# Patient Record
Sex: MALE | Employment: FULL TIME | URBAN - METROPOLITAN AREA
[De-identification: names, ages, dates, MRNs, and addresses within clinical notes are randomized per-mention and may not be internally consistent; named-entity substitution may affect disease eponyms.]

---

## 2021-02-10 ENCOUNTER — OFFICE VISIT (OUTPATIENT)
Dept: FAMILY MEDICINE CLINIC | Facility: HOSPITAL | Age: 61
End: 2021-02-10
Payer: COMMERCIAL

## 2021-02-10 VITALS
OXYGEN SATURATION: 96 % | HEART RATE: 76 BPM | SYSTOLIC BLOOD PRESSURE: 132 MMHG | HEIGHT: 70 IN | TEMPERATURE: 98.4 F | BODY MASS INDEX: 36.59 KG/M2 | WEIGHT: 255.6 LBS | DIASTOLIC BLOOD PRESSURE: 80 MMHG

## 2021-02-10 DIAGNOSIS — N52.9 ERECTILE DYSFUNCTION, UNSPECIFIED ERECTILE DYSFUNCTION TYPE: ICD-10-CM

## 2021-02-10 DIAGNOSIS — Z12.5 SCREENING FOR MALIGNANT NEOPLASM OF PROSTATE: ICD-10-CM

## 2021-02-10 DIAGNOSIS — Z00.00 ANNUAL PHYSICAL EXAM: Primary | ICD-10-CM

## 2021-02-10 PROBLEM — E78.5 HYPERLIPOPROTEINEMIA: Status: ACTIVE | Noted: 2021-02-10

## 2021-02-10 PROCEDURE — 3725F SCREEN DEPRESSION PERFORMED: CPT | Performed by: FAMILY MEDICINE

## 2021-02-10 PROCEDURE — 1036F TOBACCO NON-USER: CPT | Performed by: FAMILY MEDICINE

## 2021-02-10 PROCEDURE — 99386 PREV VISIT NEW AGE 40-64: CPT | Performed by: FAMILY MEDICINE

## 2021-02-10 PROCEDURE — 3008F BODY MASS INDEX DOCD: CPT | Performed by: FAMILY MEDICINE

## 2021-02-10 RX ORDER — MULTIVITAMIN
1 TABLET ORAL DAILY
COMMUNITY

## 2021-02-10 RX ORDER — SILDENAFIL 100 MG/1
100 TABLET, FILM COATED ORAL DAILY PRN
Qty: 10 TABLET | Refills: 0 | Status: SHIPPED | OUTPATIENT
Start: 2021-02-10 | End: 2021-03-01 | Stop reason: SDUPTHER

## 2021-02-10 NOTE — PROGRESS NOTES
Valencia Grover MD    NAME: Jay Samuel  AGE: 56 y o  SEX: male  : 1960     DATE: 2021     Assessment and Plan:     Problem List Items Addressed This Visit     None      Visit Diagnoses     Annual physical exam    -  Primary    Relevant Orders    CBC and Platelet (Completed)    Comprehensive metabolic panel (Completed)    Hemoglobin A1C With EAG    Lipid Panel with Direct LDL reflex (Completed)    TSH, 3rd generation (Completed)    Erectile dysfunction, unspecified erectile dysfunction type        Relevant Medications    sildenafil (VIAGRA) 100 mg tablet    Other Relevant Orders    Testosterone (Completed)    Screening for malignant neoplasm of prostate        Relevant Orders    PSA, Total Screen (Completed)          Immunizations and preventive care screenings were discussed with patient today  Appropriate education was printed on patient's after visit summary  Counseling:  Alcohol/drug use: discussed moderation in alcohol intake, the recommendations for healthy alcohol use, and avoidance of illicit drug use  Dental Health: discussed importance of regular tooth brushing, flossing, and dental visits  Injury prevention: discussed safety/seat belts, safety helmets, smoke detectors, carbon dioxide detectors, and smoking near bedding or upholstery  · Exercise: the importance of regular exercise/physical activity was discussed  Recommend exercise 3-5 times per week for at least 30 minutes  BMI Counseling: Body mass index is 36 67 kg/m²  The BMI is above normal  Nutrition recommendations include decreasing portion sizes, moderation in carbohydrate intake and reducing intake of cholesterol  Exercise recommendations include vigorous physical activity 75 minutes/week  No pharmacotherapy was ordered             Return in about 1 year (around 2/10/2022) for Annual physical      Chief Complaint:     Chief Complaint   Patient presents with   Ankit Doty Annual Exam    Establish Care      History of Present Illness:     Adult Annual Physical   Patient here for a comprehensive physical exam  The patient reports no problems  Aware of weight gain from snacking  Will get back into martial arts    Wants to get blood work done  Had elevated sugar  Family history of Type 2 diabetes  Had colonoscopy   Diet and Physical Activity  · Diet/Nutrition: well balanced diet  · Exercise: moderate cardiovascular exercise  Depression Screening  PHQ-9 Depression Screening    PHQ-9:   Frequency of the following problems over the past two weeks:      Little interest or pleasure in doing things: 0 - not at all  Feeling down, depressed, or hopeless: 0 - not at all  PHQ-2 Score: 0       General Health  · Sleep: sleeps well  · Hearing: normal - bilateral   · Vision: no vision problems  · Dental: regular dental visits and brushes teeth twice daily   Health  · Symptoms include: erectile dysfunction     Review of Systems:     Review of Systems   Constitutional: Positive for unexpected weight change  Negative for fatigue  HENT: Negative  Respiratory: Negative  Cardiovascular: Negative  Gastrointestinal: Negative for abdominal pain  Genitourinary: Negative  Musculoskeletal: Negative  Neurological: Negative  Psychiatric/Behavioral: Negative  All other systems reviewed and are negative  Past Medical History:     History reviewed  No pertinent past medical history  Past Surgical History:     History reviewed  No pertinent surgical history     Family History:     Family History   Problem Relation Age of Onset    Lung cancer Mother     Pancreatic cancer Father     Diabetes Father     Breast cancer Sister     Diabetes Maternal Grandmother     Diabetes Paternal Grandmother       Social History:        Social History     Socioeconomic History    Marital status: Unknown     Spouse name: None    Number of children: None    Years of education: None    Highest education level: None   Occupational History    None   Social Needs    Financial resource strain: None    Food insecurity     Worry: None     Inability: None    Transportation needs     Medical: None     Non-medical: None   Tobacco Use    Smoking status: Former Smoker     Quit date: 2/10/1990     Years since quittin 0    Smokeless tobacco: Never Used   Substance and Sexual Activity    Alcohol use: None    Drug use: None    Sexual activity: None   Lifestyle    Physical activity     Days per week: None     Minutes per session: None    Stress: None   Relationships    Social connections     Talks on phone: None     Gets together: None     Attends Oriental orthodox service: None     Active member of club or organization: None     Attends meetings of clubs or organizations: None     Relationship status: None    Intimate partner violence     Fear of current or ex partner: None     Emotionally abused: None     Physically abused: None     Forced sexual activity: None   Other Topics Concern    None   Social History Narrative    None      Current Medications:     Current Outpatient Medications   Medication Sig Dispense Refill    ELDERBERRY PO Take by mouth      Multiple Vitamin (multivitamin) tablet Take 1 tablet by mouth daily      sildenafil (VIAGRA) 100 mg tablet Take 1 tablet (100 mg total) by mouth daily as needed for erectile dysfunction 10 tablet 0     No current facility-administered medications for this visit  Allergies:     No Known Allergies   Physical Exam:     /80 (Patient Position: Sitting, Cuff Size: Large)   Pulse 76   Temp 98 4 °F (36 9 °C) (Temporal)   Ht 5' 10" (1 778 m)   Wt 116 kg (255 lb 9 6 oz)   SpO2 96%   BMI 36 67 kg/m²     Physical Exam  Vitals signs and nursing note reviewed  Constitutional:       Appearance: Normal appearance  HENT:      Head: Normocephalic and atraumatic        Right Ear: Tympanic membrane, ear canal and external ear normal  There is no impacted cerumen  Left Ear: Tympanic membrane, ear canal and external ear normal  There is no impacted cerumen  Nose: Nose normal       Mouth/Throat:      Mouth: Mucous membranes are moist    Eyes:      Pupils: Pupils are equal, round, and reactive to light  Neck:      Vascular: No carotid bruit  Cardiovascular:      Rate and Rhythm: Normal rate and regular rhythm  Pulses: Normal pulses  Heart sounds: Normal heart sounds  Pulmonary:      Effort: Pulmonary effort is normal       Breath sounds: Normal breath sounds  Abdominal:      General: There is no distension  Palpations: There is no mass  Musculoskeletal: Normal range of motion  Lymphadenopathy:      Cervical: No cervical adenopathy  Skin:     General: Skin is warm  Neurological:      General: No focal deficit present  Mental Status: He is alert and oriented to person, place, and time  Psychiatric:         Mood and Affect: Mood normal          Behavior: Behavior normal          Thought Content:  Thought content normal          Judgment: Judgment normal           MD Negin Estrada MD

## 2021-02-10 NOTE — PATIENT INSTRUCTIONS

## 2021-02-11 ENCOUNTER — TELEPHONE (OUTPATIENT)
Dept: FAMILY MEDICINE CLINIC | Facility: HOSPITAL | Age: 61
End: 2021-02-11

## 2021-02-11 ENCOUNTER — TELEPHONE (OUTPATIENT)
Dept: ADMINISTRATIVE | Facility: OTHER | Age: 61
End: 2021-02-11

## 2021-02-11 NOTE — TELEPHONE ENCOUNTER
Upon review of the In Basket request and the patient's chart, initial outreach has been made via fax, please see Contacts section for details  Thank you  Ronald Kat     [facility] Sincere Oneil MD [phone] Fax [phone]

## 2021-02-11 NOTE — LETTER
Procedure Request Form: Colonoscopy      Date Requested: 21  Patient: Real Link  Patient : 1960   Referring Provider: Allyssa Hendrix, MD     2nd Request        Date of Procedure _____ or most recent colonscopy report__________       The above patient has informed us that they have completed their   most recent Colonoscopy at your facility  Please complete   this form and attach all corresponding procedure reports/results  Comments __________________________________________________________  ____________________________________________________________________  ____________________________________________________________________  ____________________________________________________________________    Facility Completing Procedure _________________________________________    Form Completed By (print name) _______________________________________      Signature __________________________________________________________      These reports are needed for  compliance    Please fax this completed form and a copy of the procedure report to our office located at [place] [address] as soon as possible to [phone] fabi Moreno: Phone [phone]    We thank you for your assistance in treating our mutual patient

## 2021-02-11 NOTE — TELEPHONE ENCOUNTER
Spoke w/ pt insurance - confirmed that R73 9 hyperglycemia is a covered dx for cpt 51496 A1C  Spoke w/ patient - he is ok with calling [facility] to have the A1C added      [facility] contacted and A1C added to blood drawn 2/11/21

## 2021-02-11 NOTE — TELEPHONE ENCOUNTER
fyi - Per [facility] the a1c ordered would not be covered using dx code z00 00  No diabetes dx in chart  Advised [facility] to get an abn signed or not draw the a1c - up to the patient

## 2021-02-11 NOTE — LETTER
Procedure Request Form: Colonoscopy      Date Requested: 21  Patient: Carolina Pi  Patient : 1960   Referring Provider: Vandana Allen, MD        Date of Procedure _____ report_________________________       The above patient has informed us that they have completed their   most recent Colonoscopy at your facility  Please complete   this form and attach all corresponding procedure reports/results  Comments __________________________________________________________  ____________________________________________________________________  ____________________________________________________________________  ____________________________________________________________________    Facility Completing Procedure _________________________________________    Form Completed By (print name) _______________________________________      Signature __________________________________________________________      These reports are needed for  compliance    Please fax this completed form and a copy of the procedure report to our office located at [place] [address] as soon as possible to [phone] fabi Bowers: Phone [phone]    We thank you for your assistance in treating our mutual patient

## 2021-02-12 LAB
ALBUMIN SERPL-MCNC: 4.3 G/DL (ref 3.6–5.1)
ALBUMIN/GLOB SERPL: 1.5 (CALC) (ref 1–2.5)
ALP SERPL-CCNC: 90 U/L (ref 35–144)
ALT SERPL-CCNC: 35 U/L (ref 9–46)
AST SERPL-CCNC: 21 U/L (ref 10–35)
BILIRUB SERPL-MCNC: 0.7 MG/DL (ref 0.2–1.2)
BUN SERPL-MCNC: 20 MG/DL (ref 7–25)
BUN/CREAT SERPL: ABNORMAL (CALC) (ref 6–22)
CALCIUM SERPL-MCNC: 9.5 MG/DL (ref 8.6–10.3)
CHLORIDE SERPL-SCNC: 107 MMOL/L (ref 98–110)
CHOLEST SERPL-MCNC: 199 MG/DL
CHOLEST/HDLC SERPL: 6 (CALC)
CO2 SERPL-SCNC: 26 MMOL/L (ref 20–32)
CREAT SERPL-MCNC: 1.24 MG/DL (ref 0.7–1.25)
ERYTHROCYTE [DISTWIDTH] IN BLOOD BY AUTOMATED COUNT: 12.3 % (ref 11–15)
GLOBULIN SER CALC-MCNC: 2.9 G/DL (CALC) (ref 1.9–3.7)
GLUCOSE SERPL-MCNC: 112 MG/DL (ref 65–99)
HBA1C MFR BLD: 5.4 % OF TOTAL HGB
HCT VFR BLD AUTO: 48.3 % (ref 38.5–50)
HDLC SERPL-MCNC: 33 MG/DL
HGB BLD-MCNC: 16.7 G/DL (ref 13.2–17.1)
LDLC SERPL CALC-MCNC: 141 MG/DL (CALC)
MCH RBC QN AUTO: 31 PG (ref 27–33)
MCHC RBC AUTO-ENTMCNC: 34.6 G/DL (ref 32–36)
MCV RBC AUTO: 89.8 FL (ref 80–100)
NONHDLC SERPL-MCNC: 166 MG/DL (CALC)
PLATELET # BLD AUTO: 205 THOUSAND/UL (ref 140–400)
PMV BLD REES-ECKER: 10.7 FL (ref 7.5–12.5)
POTASSIUM SERPL-SCNC: 4.7 MMOL/L (ref 3.5–5.3)
PROT SERPL-MCNC: 7.2 G/DL (ref 6.1–8.1)
PSA SERPL-MCNC: 0.6 NG/ML
RBC # BLD AUTO: 5.38 MILLION/UL (ref 4.2–5.8)
REF LAB TEST NAME: NORMAL
REF LAB TEST: NORMAL
SL AMB CLIENT CONTACT: NORMAL
SL AMB EGFR AFRICAN AMERICAN: 72 ML/MIN/1.73M2
SL AMB EGFR NON AFRICAN AMERICAN: 62 ML/MIN/1.73M2
SODIUM SERPL-SCNC: 141 MMOL/L (ref 135–146)
TESTOST SERPL-MCNC: 353 NG/DL (ref 250–827)
TRIGL SERPL-MCNC: 126 MG/DL
TSH SERPL-ACNC: 3.28 MIU/L (ref 0.4–4.5)
WBC # BLD AUTO: 6 THOUSAND/UL (ref 3.8–10.8)

## 2021-02-17 NOTE — TELEPHONE ENCOUNTER
As a follow-up, a second attempt has been made for outreach via fax, please see Contacts section for details  Thank you  Ronald Fuchs MD [phone] Fax [phone]

## 2021-02-22 ENCOUNTER — TELEPHONE (OUTPATIENT)
Dept: FAMILY MEDICINE CLINIC | Facility: HOSPITAL | Age: 61
End: 2021-02-22

## 2021-02-22 NOTE — TELEPHONE ENCOUNTER
I would suggest he try GoodRx and pay out of pocket    The other meds in that category are not any less expensive

## 2021-02-23 NOTE — TELEPHONE ENCOUNTER
As a final attempt, a third outreach has been made via telephone call  Please see Contacts section for details  This encounter will be closed and completed by end of day  Should we receive the requested information because of previous outreach attempts, the requested patient's chart will be updated appropriately       Thank you  Silvia Feldman, [address] [place]

## 2021-03-01 DIAGNOSIS — N52.9 ERECTILE DYSFUNCTION, UNSPECIFIED ERECTILE DYSFUNCTION TYPE: ICD-10-CM

## 2021-03-01 RX ORDER — SILDENAFIL 100 MG/1
100 TABLET, FILM COATED ORAL DAILY PRN
Qty: 10 TABLET | Refills: 0 | Status: SHIPPED | OUTPATIENT
Start: 2021-03-01

## 2021-05-03 ENCOUNTER — VBI (OUTPATIENT)
Dept: ADMINISTRATIVE | Facility: OTHER | Age: 61
End: 2021-05-03

## 2021-10-26 ENCOUNTER — VBI (OUTPATIENT)
Dept: ADMINISTRATIVE | Facility: OTHER | Age: 61
End: 2021-10-26

## 2022-08-08 ENCOUNTER — TELEPHONE (OUTPATIENT)
Dept: FAMILY MEDICINE CLINIC | Facility: HOSPITAL | Age: 62
End: 2022-08-08

## 2022-08-16 NOTE — TELEPHONE ENCOUNTER
08/16/22 11:00 AM     Thank you for your request  Your request has been received, reviewed, and the patient chart updated  The PCP has successfully been removed with a patient attribution note  This message will now be completed      Thank you  Meño Paul

## 2024-08-12 ENCOUNTER — TELEPHONE (OUTPATIENT)
Dept: GASTROENTEROLOGY | Facility: CLINIC | Age: 64
End: 2024-08-12

## 2024-08-12 ENCOUNTER — OFFICE VISIT (OUTPATIENT)
Dept: GASTROENTEROLOGY | Facility: CLINIC | Age: 64
End: 2024-08-12
Payer: MEDICARE

## 2024-08-12 VITALS
HEIGHT: 70 IN | DIASTOLIC BLOOD PRESSURE: 83 MMHG | SYSTOLIC BLOOD PRESSURE: 156 MMHG | BODY MASS INDEX: 35.05 KG/M2 | WEIGHT: 244.8 LBS

## 2024-08-12 DIAGNOSIS — Z12.11 SCREENING FOR COLON CANCER: Primary | ICD-10-CM

## 2024-08-12 DIAGNOSIS — R19.4 CHANGE IN BOWEL HABITS: ICD-10-CM

## 2024-08-12 PROCEDURE — 99203 OFFICE O/P NEW LOW 30 MIN: CPT | Performed by: INTERNAL MEDICINE

## 2024-08-12 NOTE — TELEPHONE ENCOUNTER
Scheduled date of colonoscopy (as of today):  10-9-24  Physician performing colonoscopy:  Abigail  Location of colonoscopy:[facility]  Clearances: no   Miralax  /Dulcolax

## 2024-08-12 NOTE — PROGRESS NOTES
[facility] - Outpatient Consultation  Ra Ga 63 y.o. male MRN: [id number]  Encounter: [id number]          ASSESSMENT AND PLAN:      1. Change in bowel habits  Increasing issues with constipation.  Improved with intermittent fiber supplementation.  Suspect diet related especially since he is trying to lose weight.  No recent colonoscopy  -Start fiber supplementation  - Colonoscopy; Future    2. Screening for colon cancer  Negative Cologuard 2024    ______________________________________________________________________    HPI: This is a 63-year-old gentleman without significant past medical history is referred secondary to a change in bowel habits.  He reports that his baseline bowel movement pattern is about every other day.  He has not seen a gastroenterologist recently.  He has a distant history of a colonoscopy but his most recent colon cancer screening was a negative Cologuard in January 2024.  He reports that over the last several months has had increasing issues with constipation.  He is moving his bowels only once or twice a week.  He denies any change in his diet or medications.  He reports that he has been more sedentary and does not drink enough water.  He reports that if he tries Isaak seeds in his yogurt he moves his bowels.  He is taking an over-the-counter supplement which also will help to move his bowels but he does not know what is in it.  His weight is down about 10 pounds which is deliberate.  He denies any rectal bleeding.  He denies any upper GI symptoms.      REVIEW OF SYSTEMS:    CONSTITUTIONAL: Denies any fever, chills, rigors.  HEENT: No earache or tinnitus. Denies hearing loss or visual disturbances.  CARDIOVASCULAR: No chest pain or palpitations.   RESPIRATORY: Denies any cough, hemoptysis, shortness of breath or dyspnea on exertion.  GASTROINTESTINAL: As noted in the History of Present Illness.   GENITOURINARY: No problems with urination. Denies  "any hematuria or dysuria.  NEUROLOGIC: No dizziness or vertigo, denies headaches.   MUSCULOSKELETAL: Denies any muscle or joint pain.   SKIN: Denies skin rashes or itching.   ENDOCRINE: Denies excessive thirst. Denies intolerance to heat or cold.  PSYCHOSOCIAL: Denies depression or anxiety. Denies any recent memory loss.       Historical Information   History reviewed. No pertinent past medical history.  Past Surgical History:   Procedure Laterality Date    COLONOSCOPY      HERNIA REPAIR       Social History   Social History     Substance and Sexual Activity   Alcohol Use None     Social History     Substance and Sexual Activity   Drug Use Not on file     Social History     Tobacco Use   Smoking Status Former    Current packs/day: 0.00    Types: Cigarettes    Quit date: 2/10/1990    Years since quittin.5   Smokeless Tobacco Never     Family History   Problem Relation Age of Onset    Lung cancer Mother     Pancreatic cancer Father     Diabetes Father     Breast cancer Sister     Diabetes Maternal Grandmother     Diabetes Paternal Grandmother     Colon cancer Neg Hx        Meds/Allergies       Current Outpatient Medications:     Multiple Vitamin (multivitamin) tablet    Omega-3 Fatty Acids (OMEGA-3 FISH OIL PO)    patient supplied medication    sildenafil (VIAGRA) 100 mg tablet    ELDERBERRY PO    No Known Allergies        Objective     Blood pressure 156/83, height 5' 10\" (1.778 m), weight 111 kg (244 lb 12.8 oz). Body mass index is 35.13 kg/m².        PHYSICAL EXAM:      General Appearance:   Alert, cooperative, no distress   HEENT:   Normocephalic, atraumatic, anicteric.     Neck:  Supple, symmetrical, trachea midline   Lungs:   Clear to auscultation bilaterally; no rales, rhonchi or wheezing; respirations unlabored    Heart::   Regular rate and rhythm; no murmur, rub, or gallop.   Abdomen:   Soft, non-tender, non-distended; normal bowel sounds; no masses, no organomegaly    Genitalia:   Deferred    Rectal:   " Deferred    Extremities:  No cyanosis, clubbing or edema    Pulses:  2+ and symmetric    Skin:  No jaundice, rashes, or lesions    Lymph nodes:  No palpable cervical lymphadenopathy        Lab Results:   No visits with results within 1 Day(s) from this visit.   Latest known visit with results is:   Office Visit on 02/10/2021   Component Date Value    White Blood Cell Count 02/11/2021 6.0     Red Blood Cell Count 02/11/2021 5.38     Hemoglobin 02/11/2021 16.7     HCT 02/11/2021 48.3     MCV 02/11/2021 89.8     MCH 02/11/2021 31.0     MCHC 02/11/2021 34.6     RDW 02/11/2021 12.3     Platelet Count 02/11/2021 205     SL AMB MPV 02/11/2021 10.7     Glucose, Random 02/11/2021 112 (H)     BUN 02/11/2021 20     Creatinine 02/11/2021 1.24     eGFR Non  02/11/2021 62     eGFR  02/11/2021 72     SL AMB BUN/CREATININE RA* 02/11/2021 NOT APPLICABLE     Sodium 02/11/2021 141     Potassium 02/11/2021 4.7     Chloride 02/11/2021 107     CO2 02/11/2021 26     Calcium 02/11/2021 9.5     Protein, Total 02/11/2021 7.2     Albumin 02/11/2021 4.3     Globulin 02/11/2021 2.9     Albumin/Globulin Ratio 02/11/2021 1.5     TOTAL BILIRUBIN 02/11/2021 0.7     Alkaline Phosphatase 02/11/2021 90     AST 02/11/2021 21     ALT 02/11/2021 35     Total Cholesterol 02/11/2021 199     HDL 02/11/2021 33 (L)     Triglycerides 02/11/2021 126     LDL Calculated 02/11/2021 141 (H)     Chol HDLC Ratio 02/11/2021 6.0 (H)     Non-HDL Cholesterol 02/11/2021 166 (H)     TSH 02/11/2021 3.28     TESTOSTERONE TOTAL 02/11/2021 353     Prostate Specific Antige* 02/11/2021 0.6     Test Name 02/11/2021  HEMOGLOBIN A1c     Test Code 02/11/2021  496QHO     Client Contact 02/11/2021 ROSA TONEY     Report Always Message Si* 02/11/2021      Always Message 02/11/2021      Hemoglobin A1C 02/11/2021 5.4          Radiology Results:   No results found.

## 2024-09-11 PROBLEM — Z12.11 SCREENING FOR COLON CANCER: Status: RESOLVED | Noted: 2024-08-12 | Resolved: 2024-09-11

## 2024-09-19 ENCOUNTER — TELEPHONE (OUTPATIENT)
Dept: GASTROENTEROLOGY | Facility: CLINIC | Age: 64
End: 2024-09-19

## 2024-09-19 NOTE — TELEPHONE ENCOUNTER
Pt called to r/s colonoscopy. Colonoscopy r/s for 10/18 with Dr. Hayes in [facility].

## 2024-10-28 ENCOUNTER — ANESTHESIA (OUTPATIENT)
Dept: ANESTHESIOLOGY | Facility: AMBULATORY SURGERY CENTER | Age: 64
End: 2024-10-28

## 2024-10-28 ENCOUNTER — ANESTHESIA EVENT (OUTPATIENT)
Dept: ANESTHESIOLOGY | Facility: AMBULATORY SURGERY CENTER | Age: 64
End: 2024-10-28

## 2024-10-31 ENCOUNTER — TELEPHONE (OUTPATIENT)
Dept: GASTROENTEROLOGY | Facility: CLINIC | Age: 64
End: 2024-10-31

## 2024-11-11 ENCOUNTER — ANESTHESIA EVENT (OUTPATIENT)
Dept: ANESTHESIOLOGY | Facility: AMBULATORY SURGERY CENTER | Age: 64
End: 2024-11-11

## 2024-11-11 ENCOUNTER — ANESTHESIA EVENT (OUTPATIENT)
Dept: GASTROENTEROLOGY | Facility: AMBULATORY SURGERY CENTER | Age: 64
End: 2024-11-11

## 2024-11-11 ENCOUNTER — HOSPITAL ENCOUNTER (OUTPATIENT)
Dept: GASTROENTEROLOGY | Facility: AMBULATORY SURGERY CENTER | Age: 64
Discharge: HOME | End: 2024-11-11
Payer: COMMERCIAL

## 2024-11-11 ENCOUNTER — ANESTHESIA (OUTPATIENT)
Dept: GASTROENTEROLOGY | Facility: AMBULATORY SURGERY CENTER | Age: 64
End: 2024-11-11

## 2024-11-11 ENCOUNTER — ANESTHESIA (OUTPATIENT)
Dept: ANESTHESIOLOGY | Facility: AMBULATORY SURGERY CENTER | Age: 64
End: 2024-11-11

## 2024-11-11 VITALS
TEMPERATURE: 97.6 F | DIASTOLIC BLOOD PRESSURE: 78 MMHG | WEIGHT: 238 LBS | RESPIRATION RATE: 16 BRPM | OXYGEN SATURATION: 97 % | HEIGHT: 69 IN | BODY MASS INDEX: 35.25 KG/M2 | SYSTOLIC BLOOD PRESSURE: 131 MMHG | HEART RATE: 77 BPM

## 2024-11-11 DIAGNOSIS — R19.4 CHANGE IN BOWEL HABITS: ICD-10-CM

## 2024-11-11 PROCEDURE — 45380 COLONOSCOPY AND BIOPSY: CPT | Performed by: INTERNAL MEDICINE

## 2024-11-11 PROCEDURE — 88305 TISSUE EXAM BY PATHOLOGIST: CPT | Performed by: PATHOLOGY

## 2024-11-11 RX ORDER — SODIUM CHLORIDE, SODIUM LACTATE, POTASSIUM CHLORIDE, CALCIUM CHLORIDE 600; 310; 30; 20 MG/100ML; MG/100ML; MG/100ML; MG/100ML
50 INJECTION, SOLUTION INTRAVENOUS CONTINUOUS
Status: DISCONTINUED | OUTPATIENT
Start: 2024-11-11 | End: 2024-11-15 | Stop reason: HOSPADM

## 2024-11-11 RX ORDER — PROPOFOL 10 MG/ML
INJECTION, EMULSION INTRAVENOUS AS NEEDED
Status: DISCONTINUED | OUTPATIENT
Start: 2024-11-11 | End: 2024-11-11

## 2024-11-11 RX ADMIN — PROPOFOL 50 MG: 10 INJECTION, EMULSION INTRAVENOUS at 09:58

## 2024-11-11 RX ADMIN — PROPOFOL 50 MG: 10 INJECTION, EMULSION INTRAVENOUS at 09:46

## 2024-11-11 RX ADMIN — PROPOFOL 50 MG: 10 INJECTION, EMULSION INTRAVENOUS at 09:52

## 2024-11-11 RX ADMIN — SODIUM CHLORIDE, SODIUM LACTATE, POTASSIUM CHLORIDE, CALCIUM CHLORIDE 50 ML/HR: 600; 310; 30; 20 INJECTION, SOLUTION INTRAVENOUS at 09:19

## 2024-11-11 RX ADMIN — PROPOFOL 50 MG: 10 INJECTION, EMULSION INTRAVENOUS at 09:44

## 2024-11-11 RX ADMIN — PROPOFOL 150 MG: 10 INJECTION, EMULSION INTRAVENOUS at 09:41

## 2024-11-11 RX ADMIN — PROPOFOL 50 MG: 10 INJECTION, EMULSION INTRAVENOUS at 09:49

## 2024-11-11 NOTE — H&P
"[facility] and Physical -  [facility]  Ra Ga 64 y.o. male MRN: [id number]    HPI: Ra Ga is a 64 y.o. year old male who presents for colonoscopy secondary to change in bowel habits    REVIEW OF SYSTEMS: Per the HPI, and otherwise unremarkable.    Historical Information   Past Medical History:   Diagnosis Date    Patient denies medical problems      Past Surgical History:   Procedure Laterality Date    COLONOSCOPY      HERNIA REPAIR       Social History   Social History     Substance and Sexual Activity   Alcohol Use Yes    Comment: social     Social History     Substance and Sexual Activity   Drug Use Never     Social History     Tobacco Use   Smoking Status Former    Current packs/day: 0.00    Types: Cigarettes    Quit date: 2/10/1990    Years since quittin.7   Smokeless Tobacco Never     Family History   Problem Relation Age of Onset    Lung cancer Mother     Pancreatic cancer Father     Diabetes Father     Breast cancer Sister     Diabetes Maternal Grandmother     Diabetes Paternal Grandmother     Colon cancer Neg Hx        Meds/Allergies       Current Outpatient Medications:     ELDERBERRY PO    Multiple Vitamin (multivitamin) tablet    Omega-3 Fatty Acids (OMEGA-3 FISH OIL PO)    patient supplied medication    sildenafil (VIAGRA) 100 mg tablet    Current Facility-Administered Medications:     lactated ringers infusion, 50 mL/hr, Intravenous, Continuous, 50 mL/hr at 24 0919    No Known Allergies    Objective     /82   Pulse 74   Temp 97.6 °F (36.4 °C) (Temporal)   Resp 18   Ht 5' 9\" (1.753 m)   Wt 108 kg (238 lb)   SpO2 96%   BMI 35.15 kg/m²     PHYSICAL EXAM    Gen: NAD AAOx3  Head: Normocephalic, Atraumatic  CV: S1S2 RRR no m/r/g  CHEST: Clear b/l no c/r/w  ABD: soft, +BS NT/ND  EXT: no edema    ASSESSMENT/PLAN:  This is a 64 y.o. year old male here for colonoscopy secondary to change in bowel habits, and he is stable and optimized for his procedure.        "

## 2024-11-11 NOTE — ANESTHESIA POSTPROCEDURE EVALUATION
Post-Op Assessment Note    CV Status:  Stable  Pain Score: 0    Pain management: adequate       Mental Status:  Sleepy   Hydration Status:  Euvolemic   PONV Controlled:  Controlled   Airway Patency:  Patent     Post Op Vitals Reviewed: Yes    No anethesia notable event occurred.    Staff: Anesthesiologist, CRNA           Last Filed PACU Vitals:  Vitals Value Taken Time   Temp     Pulse 75    /71    Resp 18    SpO2 96        Modified Tyrell:  Activity: 2 (11/11/2024  9:15 AM)  Respiration: 2 (11/11/2024  9:15 AM)  Circulation: 2 (11/11/2024  9:15 AM)  Consciousness: 2 (11/11/2024  9:15 AM)  Oxygen Saturation: 2 (11/11/2024  9:15 AM)  Modified Tyrell Score: 10 (11/11/2024  9:15 AM)

## 2024-11-11 NOTE — ANESTHESIA PREPROCEDURE EVALUATION
Procedure:  COLONOSCOPY    Relevant Problems   No relevant active problems        Physical Exam    Airway    Mallampati score: II         Dental        Cardiovascular  Cardiovascular exam normal    Pulmonary  Pulmonary exam normal     Other Findings        Anesthesia Plan  ASA Score- 3     Anesthesia Type- IV sedation with anesthesia with ASA Monitors.         Additional Monitors:     Airway Plan:            Plan Factors-Exercise tolerance (METS): >4 METS.    Chart reviewed.   Existing labs reviewed. Patient summary reviewed.    Patient is not a current smoker.              Induction-     Postoperative Plan-     Perioperative Resuscitation Plan - Level 1 - Full Code.       Informed Consent- Anesthetic plan and risks discussed with patient.  I personally reviewed this patient with the CRNA. Discussed and agreed on the Anesthesia Plan with the CRNA..

## 2024-11-14 PROCEDURE — 88305 TISSUE EXAM BY PATHOLOGIST: CPT | Performed by: PATHOLOGY

## 2024-11-20 ENCOUNTER — RESULTS FOLLOW-UP (OUTPATIENT)
Dept: GASTROENTEROLOGY | Facility: CLINIC | Age: 64
End: 2024-11-20